# Patient Record
(demographics unavailable — no encounter records)

---

## 2024-12-12 NOTE — HISTORY OF PRESENT ILLNESS
[HIV Test offered] : HIV Test offered [Syphilis test offered] : Syphilis test offered [Gonorrhea test offered] : Gonorrhea test offered [Chlamydia test offered] : Chlamydia test offered [Hepatitis B test offered] : Hepatitis B test offered [Hepatitis C test offered] : Hepatitis C test offered [FreeTextEntry1] :  27yo , LMP x3 yrs ago (recent  2024 and on Depo Provera x3yrs), who presents to establish care with questions regarding obtaining a tubal ligation. Patient otherwise without any complaints. Patient says she is done with having children and despite liking Depo-Provera, does not want to be on this long-term. Had tried a Paraguard IUD in the past but was having painful menses. Additionally, states she feels she cannot reliably take pills  - Of note, is on Depo-Provera with last inject ion 2024 according to records on phone   #HM - Pap: , normal per patient report  - Colonoscopy: n/a - Mammogram: n/a    ObHx:  - ToP x2 w/ D&C x2 - . . c/b PPROM at 34-36wks (unclear based on chart review) - . PINA @ [facility] - 2024. PINA @ [facility] GynHx: Denies  MedHx: Denies  SurgHx: Describes pilonidal cyst surgery Meds: Vitamins  Allergies: NKDA FamHx: Denies any family history of uterine, ovarian, or breast cancer SocHx: Denies t/e/d  - Feels safe at home with children and partner - Is a   [PGxTotal] : 6 [[facility]] : 2 [PGxPremature] : 1 [PGHxAbortions] : 2 [[facility]iving] : 3

## 2024-12-12 NOTE — PLAN
[FreeTextEntry1] : 25yo , LMP x3 yrs ago (recent  2024 and on Depo Provera x3yrs), who presents to establish care with questions regarding obtaining a tubal ligation  #Contraception - Reviewed that she could get another dose of Depo-Provera in x2 weeks (and to discuss salpingectomy further then) given it has been <12 weeks since last dose. I reviewed with her that there is concerns of decreased bone density in those who are on Depo-Provera for prolonged periods of time, but that studies have shown that these changes are reversible after discontinuation - Reviewed other forms of contraception including vasectomy. Patient reaffirmed desire for salpingectomy and understands it is irreversible and any future pregnancies would require IVF  - Reviewed laparoscopic salpingectomy, recovery period, and minimum of 30 day waiting period given [place] Medicaid insurance. All questions were answered to the patient's apparent satisfaction   #HM - Pap and STD screening ordered/obtained today   Manish Moser, PGY-3 Obstetrics and Gynecology    d/w Dr. TATUM Davenport  RTC x2 weeks for Depo-Provera and scheduling/coordinating b/l salpingectomy (will require signing paperwork with next visit as SW as not present at time of appointment today)

## 2024-12-12 NOTE — PHYSICAL EXAM
[Chaperone Present] : A chaperone was present in the examining room during all aspects of the physical examination [94197] : A chaperone was present during the pelvic exam. [Appropriately responsive] : appropriately responsive [Alert] : alert [Soft] : soft [Non-tender] : non-tender [Non-distended] : non-distended [Examination Of The Breasts] : a normal appearance [No Masses] : no breast masses were palpable [Labia Majora] : normal [Labia Minora] : normal [Normal] : normal [FreeTextEntry4] : Physiologic discharge in the vault  [FreeTextEntry6] : Small, mid-position, and non-tender. No adnexal fullness or tenderness bilaterally

## 2024-12-12 NOTE — PHYSICAL EXAM
[Chaperone Present] : A chaperone was present in the examining room during all aspects of the physical examination [[id number]] : A chaperone was present during the pelvic exam. [Appropriately responsive] : appropriately responsive [Alert] : alert [Soft] : soft [Non-tender] : non-tender [Non-distended] : non-distended [Examination Of The Breasts] : a normal appearance [No Masses] : no breast masses were palpable [Labia Majora] : normal [Labia Minora] : normal [Normal] : normal [FreeTextEntry4] : Physiologic discharge in the vault  [FreeTextEntry6] : Small, mid-position, and non-tender. No adnexal fullness or tenderness bilaterally

## 2024-12-12 NOTE — HISTORY OF PRESENT ILLNESS
[HIV Test offered] : HIV Test offered [Syphilis test offered] : Syphilis test offered [Gonorrhea test offered] : Gonorrhea test offered [Chlamydia test offered] : Chlamydia test offered [Hepatitis B test offered] : Hepatitis B test offered [Hepatitis C test offered] : Hepatitis C test offered [FreeTextEntry1] :  25yo , LMP x3 yrs ago (recent  2024 and on Depo Provera x3yrs), who presents to establish care with questions regarding obtaining a tubal ligation. Patient otherwise without any complaints. Patient says she is done with having children and despite liking Depo-Provera, does not want to be on this long-term. Had tried a Paraguard IUD in the past but was having painful menses. Additionally, states she feels she cannot reliably take pills  - Of note, is on Depo-Provera with last inject ion 2024 according to records on phone   #HM - Pap: , normal per patient report  - Colonoscopy: n/a - Mammogram: n/a    ObHx:  - ToP x2 w/ D&C x2 - . . c/b PPROM at 34-36wks (unclear based on chart review) - . PINA @ [facility] - 2024. PINA @ [facility] GynHx: Denies  MedHx: Denies  SurgHx: Describes pilonidal cyst surgery Meds: Vitamins  Allergies: NKDA FamHx: Denies any family history of uterine, ovarian, or breast cancer SocHx: Denies t/e/d  - Feels safe at home with children and partner - Is a   [PGxTotal] : 6 [[facility]xFullTerm] : 2 [PGxPremature] : 1 [[facility] Centeriving] : 3 [PGHxAbortions] : 2

## 2024-12-12 NOTE — REVIEW OF SYSTEMS
[Abdominal Pain] : no abdominal pain [Vomiting] : no vomiting [Nausea] : no nausea [Abn Vaginal bleeding] : no abnormal vaginal bleeding

## 2025-01-18 NOTE — HISTORY OF PRESENT ILLNESS
[FreeTextEntry1] : 27 y/o , LMP x3 yrs ago (recent  2024 and on Depo Provera x3yrs) presenting for follow up visit regarding desire for permanent sterilization with bilateral salpingectomy. Patient otherwise doing well without complaints. In the past, the patient has tried Paraguard IUD but had painful menses. She has been on the Depo Provera for 3 years but understands that it is not a good long-term option. The patient understands that a bilateral salpingectomy is permanent and irreversible.   #HM - Pap: , normal per patient report - Colonoscopy: n/a - Mammogram: n/a  ObHx: - ToP x2 w/ D&C x2 - . . c/b PPROM at 34-36wks (unclear based on chart review) - . PINA @ [facility] - 2024. PINA @ [facility] GynHx: Denies MedHx: Transient iron deficiency anemia prior to most recent pregnancy, now resolved.  SurgHx: Describes pilonidal cyst surgery Meds: Vitamins Allergies: NKDA FamHx: Denies any family history of uterine, ovarian, or breast cancer. Father has DM (patient unsure whether T1DM vs. T2DM) SocHx: Denies t/e/d - Feels safe at home with children and partner - Is a   Physical/pelvic exam deferred due to recent exam on December 10, 2024.

## 2025-01-18 NOTE — DISCUSSION/SUMMARY
[FreeTextEntry1] : 27 y/o here for pre-counseling visit for bilateral salpingectomy.   Risks and benefits of the procedure were discussed with the patient. Benefits include sterilization and risk reduction for ovarian cancer. Patient understands that sterilization is non-reversible. Alternative contraceptive options were discussed, patient declines at this time. Risks such as bleeding, infection, damage to nearby organs, and complications from anesthesia were discussed and questions answered. Patient also understands that a bilateral salpingectomy does not protect against sexually transmitted diseases and infections. Questions were answered. Patient would like to proceed with scheduling surgery.   kandace Hidalgo, PGY1

## 2025-01-18 NOTE — HISTORY OF PRESENT ILLNESS
[FreeTextEntry1] : 25 y/o , LMP x3 yrs ago (recent  2024 and on Depo Provera x3yrs) presenting for follow up visit regarding desire for permanent sterilization with bilateral salpingectomy. Patient otherwise doing well without complaints. In the past, the patient has tried Paraguard IUD but had painful menses. She has been on the Depo Provera for 3 years but understands that it is not a good long-term option. The patient understands that a bilateral salpingectomy is permanent and irreversible.   #HM - Pap: , normal per patient report - Colonoscopy: n/a - Mammogram: n/a  ObHx: - ToP x2 w/ D&C x2 - . . c/b PPROM at 34-36wks (unclear based on chart review) - . PINA @ [facility] - 2024. PINA @ [facility] GynHx: Denies MedHx: Transient iron deficiency anemia prior to most recent pregnancy, now resolved.  SurgHx: Describes pilonidal cyst surgery Meds: Vitamins Allergies: NKDA FamHx: Denies any family history of uterine, ovarian, or breast cancer. Father has DM (patient unsure whether T1DM vs. T2DM) SocHx: Denies t/e/d - Feels safe at home with children and partner - Is a   Physical/pelvic exam deferred due to recent exam on December 10, 2024.

## 2025-05-13 NOTE — HISTORY OF PRESENT ILLNESS
[Pain is well-controlled] : pain is well-controlled [Clean/Dry/Intact] : clean, dry and intact [Healed] : healed [Pathology reviewed] : pathology reviewed [Fever] : no fever [Chills] : no chills [Nausea] : no nausea [Vomiting] : no vomiting [Diarrhea] : no diarrhea [Vaginal Bleeding] : no vaginal bleeding [Pelvic Pressure] : no pelvic pressure [Dysuria] : no dysuria [Vaginal Discharge] : no vaginal discharge [Constipation] : no constipation [Erythema] : not erythematous [Swelling] : not swollen [Dehiscence] : not dehisced [Discharge] : absent of discharge [de-identified] : Bilateral salpingectomy [de-identified] : 27 y/o POD#15 from [facility] on 4/28 presenting for post-op check [de-identified] : Abdomen non-tender, soft.

## 2025-05-13 NOTE — ASSESSMENT
[Doing Well] : is doing well [Excellent Pain Control] : has excellent pain control [No Sign of Infection] : is showing no signs of infection [de-identified] : 25y/o POD#15 recovering well after bilateral salpingectomy.   RTC for annual appointment or as needed.  kandace Hidalgo. PGY-1

## 2025-05-13 NOTE — ASSESSMENT
[Doing Well] : is doing well [Excellent Pain Control] : has excellent pain control [No Sign of Infection] : is showing no signs of infection [de-identified] : 27y/o POD#15 recovering well after bilateral salpingectomy.   RTC for annual appointment or as needed.  kandace Hidalgo. PGY-1

## 2025-05-13 NOTE — HISTORY OF PRESENT ILLNESS
[Pain is well-controlled] : pain is well-controlled [Clean/Dry/Intact] : clean, dry and intact [Healed] : healed [Pathology reviewed] : pathology reviewed [Fever] : no fever [Chills] : no chills [Nausea] : no nausea [Vomiting] : no vomiting [Diarrhea] : no diarrhea [Vaginal Bleeding] : no vaginal bleeding [Pelvic Pressure] : no pelvic pressure [Dysuria] : no dysuria [Vaginal Discharge] : no vaginal discharge [Constipation] : no constipation [Erythema] : not erythematous [Swelling] : not swollen [Dehiscence] : not dehisced [Discharge] : absent of discharge [de-identified] : Bilateral salpingectomy [de-identified] : 27 y/o POD#15 from [facility] on 4/28 presenting for post-op check [de-identified] : Abdomen non-tender, soft.